# Patient Record
Sex: FEMALE | Race: WHITE
[De-identification: names, ages, dates, MRNs, and addresses within clinical notes are randomized per-mention and may not be internally consistent; named-entity substitution may affect disease eponyms.]

---

## 2019-07-05 ENCOUNTER — HOSPITAL ENCOUNTER (EMERGENCY)
Dept: HOSPITAL 11 - JP.ED | Age: 49
Discharge: HOME | End: 2019-07-05
Payer: COMMERCIAL

## 2019-07-05 DIAGNOSIS — Z88.6: ICD-10-CM

## 2019-07-05 DIAGNOSIS — R07.89: ICD-10-CM

## 2019-07-05 DIAGNOSIS — K21.9: Primary | ICD-10-CM

## 2019-07-05 PROCEDURE — 93005 ELECTROCARDIOGRAM TRACING: CPT

## 2019-07-05 PROCEDURE — 82553 CREATINE MB FRACTION: CPT

## 2019-07-05 PROCEDURE — 84484 ASSAY OF TROPONIN QUANT: CPT

## 2019-07-05 PROCEDURE — 85025 COMPLETE CBC W/AUTO DIFF WBC: CPT

## 2019-07-05 PROCEDURE — 36415 COLL VENOUS BLD VENIPUNCTURE: CPT

## 2019-07-05 PROCEDURE — 80048 BASIC METABOLIC PNL TOTAL CA: CPT

## 2019-07-05 PROCEDURE — 96360 HYDRATION IV INFUSION INIT: CPT

## 2019-07-05 PROCEDURE — 99285 EMERGENCY DEPT VISIT HI MDM: CPT

## 2019-07-05 NOTE — EDM.PDOC
ED HPI GENERAL MEDICAL PROBLEM





- General


Chief Complaint: Cardiovascular Problem


Stated Complaint: TIGHTNESS IN CHEST


Time Seen by Provider: 07/05/19 19:15


Source of Information: Reports: Patient


History Limitations: Reports: No Limitations





- History of Present Illness


INITIAL COMMENTS - FREE TEXT/NARRATIVE: 





49 yo female presents to ER with episode of chest pain.  She had been having 

intermittent chest discomfort for the last 2 weeks.  This evening after eating 

pizza for supper she was driving home and developed chest pain and nausea, 

began to feel flush.  She was nauseated but did not vomit.  she is a nonsmoker.

  No cardiac family history.  currently both arms "feel funny" she denies 

current chest pain. 


  ** chest tightness


Pain Score (Numeric/FACES): 5





- Related Data


 Allergies











Allergy/AdvReac Type Severity Reaction Status Date / Time


 


ibuprofen Allergy  Hives Uncoded 07/05/19 19:12











Home Meds: 


 Home Meds





NK [No Known Home Meds]  07/05/19 [History]











Past Medical History


Genitourinary History: Reports: Renal Calculus


OB/GYN History: Reports: Pregnancy


Neurological History: Reports: Concussion


Psychiatric History: Reports: Depression


Endocrine/Metabolic History: Reports: Obesity/BMI 30+





- Past Surgical History


GI Surgical History: Reports: Appendectomy, Cholecystectomy


Female  Surgical History: Reports: Ureteral Stent





Social & Family History





- Tobacco Use


Smoking Status *Q: Never Smoker





- Caffeine Use


Caffeine Use: Reports: Coffee





- Recreational Drug Use


Recreational Drug Use: No





ED ROS GENERAL





- Review of Systems


Review Of Systems: See Below


Constitutional: Denies: Fever, Chills, Fatigue


Respiratory: Denies: Shortness of Breath, Wheezing


Cardiovascular: Reports: Chest Pain.  Denies: Edema


GI/Abdominal: Reports: Abdominal Pain (mild epigastric)


Skin: Denies: Rash





ED EXAM, GENERAL





- Physical Exam


Exam: See Below


Exam Limited By: No Limitations


General Appearance: Alert, WD/WN, No Apparent Distress


Head: Atraumatic, Normocephalic


Neck: Normal Inspection, Supple, Non-Tender, Full Range of Motion.  No: 

Lymphadenopathy (R), Lymphadenopathy (L)


Respiratory/Chest: No Respiratory Distress, Lungs Clear, Normal Breath Sounds, 

No Accessory Muscle Use, Chest Non-Tender.  No: Crackles, Rhonchi, Wheezing


Cardiovascular: Normal Peripheral Pulses, Regular Rate, Rhythm, No Edema, No 

Murmur


GI/Abdominal: Normal Bowel Sounds, Soft, No Abnormal Bruit, No Mass, Tender (

epigastric mild)


Neurological: Alert, Oriented


Psychiatric: Normal Affect, Normal Mood


Skin Exam: Warm, Dry, Intact





Course





- Vital Signs


Last Recorded V/S: 


 Last Vital Signs











Temp  35.9 C   07/05/19 19:12


 


Pulse  73   07/05/19 19:54


 


Resp  12   07/05/19 19:54


 


BP  153/80 H  07/05/19 19:54


 


Pulse Ox  100   07/05/19 19:54














- Orders/Labs/Meds


Orders: 


 Active Orders 24 hr











 Category Date Time Status


 


 Cardiac Monitoring [RC] .As Directed Care  07/05/19 19:32 Active


 


 EKG Documentation Completion [RC] ASDIRECTED Care  07/05/19 19:33 Active


 


 Sodium Chloride 0.9% [Normal Saline] 1,000 ml Med  07/05/19 19:45 Active





 IV ASDIRECTED   


 


 ED GI Medications Reflex [OM.PC] Stat Oth  07/05/19 19:32 Ordered


 


 EKG 12 Lead [EK] Stat Ther  07/05/19 19:33 Ordered








 Medication Orders





Sodium Chloride (Normal Saline)  1,000 mls @ 500 mls/hr IV ASDIRECTED PAMELA


   Last Admin: 07/05/19 19:49  Dose: 500 mls/hr








Labs: 


 Laboratory Tests











  07/05/19 07/05/19 Range/Units





  19:52 19:52 


 


WBC  8.6   (4.5-11.0)  K/uL


 


RBC  5.23   (3.30-5.50)  M/uL


 


Hgb  15.1 H   (12.0-15.0)  g/dL


 


Hct  46.5   (36.0-48.0)  %


 


MCV  89   (80-98)  fL


 


MCH  29   (27-31)  pg


 


MCHC  33   (32-36)  %


 


Plt Count  213   (150-400)  K/uL


 


Neut % (Auto)  66   (36-66)  %


 


Lymph % (Auto)  23 L   (24-44)  %


 


Mono % (Auto)  9 H   (2-6)  %


 


Eos % (Auto)  2   (2-4)  %


 


Baso % (Auto)  1   (0-1)  %


 


Sodium   140  (140-148)  mmol/L


 


Potassium   3.9  (3.6-5.2)  mmol/L


 


Chloride   101  (100-108)  mmol/L


 


Carbon Dioxide   30  (21-32)  mmol/L


 


Anion Gap   9.1  (5.0-14.0)  mmol/L


 


BUN   11  (7-18)  mg/dL


 


Creatinine   0.8  (0.6-1.0)  mg/dL


 


Est Cr Clr Drug Dosing   78.23  mL/min


 


Estimated GFR (MDRD)   > 60  (>60)  


 


Glucose   122 H  ()  mg/dL


 


Calcium   9.9  (8.5-10.1)  mg/dL


 


CK-MB (CK-2)   1.0  (0-3.6)  mg/mL


 


Troponin I   0.027  (0.000-0.056)  ng/mL











Meds: 


Medications











Generic Name Dose Route Start Last Admin





  Trade Name Freq  PRN Reason Stop Dose Admin


 


Sodium Chloride  1,000 mls @ 500 mls/hr  07/05/19 19:45  07/05/19 19:49





  Normal Saline  IV   500 mls/hr





  ASDIRECTED PAMELA   Administration





     





     





     





     














Discontinued Medications














Generic Name Dose Route Start Last Admin





  Trade Name Freq  PRN Reason Stop Dose Admin


 


Al Hydroxide/Mg Hydroxide 15  0 ml  07/05/19 19:34  07/05/19 19:50





  ml/ Lidocaine HCl 15 ml  PO  07/05/19 19:35  30 ml





  ONETIME ONE   Administration





     





     





     





     














- Re-Assessments/Exams


Free Text/Narrative Re-Assessment/Exam: 





07/05/19 20:37


GI cocktail relieved pain.  EKG unremarkable. Trp negative








Departure





- Departure


Time of Disposition: 20:44


Disposition: Home, Self-Care 01


Condition: Good


Clinical Impression: 


Chest pain


Qualifiers:


 Chest pain type: other chest pain Qualified Code(s): R07.89 - Other chest pain

; R07.8 - Other chest pain





GERD (gastroesophageal reflux disease)


Qualifiers:


 Esophagitis presence: without esophagitis Qualified Code(s): K21.9 - Gastro-

esophageal reflux disease without esophagitis





Instructions:  Gastroesophageal Reflux Disease, Adult, Easy-to-Read


Referrals: 


PCP,None [Primary Care Provider] - 


Forms:  ED Department Discharge


Additional Instructions: 


keep your appt with you primary care provider for a physical


start using the omeprazole on a daily basis


 





- My Orders


Last 24 Hours: 


My Active Orders





07/05/19 19:32


Cardiac Monitoring [RC] .As Directed 


ED GI Medications Reflex [OM.PC] Stat 





07/05/19 19:33


EKG Documentation Completion [RC] ASDIRECTED 


EKG 12 Lead [EK] Stat 





07/05/19 19:45


Sodium Chloride 0.9% [Normal Saline] 1,000 ml IV ASDIRECTED 














- Assessment/Plan


Last 24 Hours: 


My Active Orders





07/05/19 19:32


Cardiac Monitoring [RC] .As Directed 


ED GI Medications Reflex [OM.PC] Stat 





07/05/19 19:33


EKG Documentation Completion [RC] ASDIRECTED 


EKG 12 Lead [EK] Stat 





07/05/19 19:45


Sodium Chloride 0.9% [Normal Saline] 1,000 ml IV ASDIRECTED